# Patient Record
(demographics unavailable — no encounter records)

---

## 2025-06-20 NOTE — HISTORY OF PRESENT ILLNESS
[FreeTextEntry8] : 22-year-old female new patient here to establish care and discuss recent fatigue and anxiety. Reports history of anemia, anxiety. Currently being treated with sertraline 200 mg daily and following with psych.   Patient states over the past year has been noticing extreme sleepiness to the point where family and friends commenting on her fatigue. States she often feels sleepy and can fall asleep at any time she is not being physical or engaging with people.  Typical daily schedule: Often naps between 5 to 8 PM after work and goes to sleep around 10 PM. Typically gets 7-8 hours of sleep during the week and on weekends can sleep up to 15 hours overnight. Patient states despite sleep significant sleep hours she can still nap during the day even when sleeping after 15 hours.   [place] Sleepiness Scale score 10.   Living with mom Graduated from college in criminology  Vaping one disposable pen every 2-3 weeks, desires to reduce intake  No tobacco use Alcohol use once every 1-2 months.  Works in  No regular exercise regimen, previously did gymnastics in school.  Sexually active with boyfriend, using withdrawal for contraception.  LMP 5/27/25  History of chlamydia 2022 with different partner.   Anxiety/panic disorder - well controlled on sertraline 200mg for several years with no recent dose changes. No current therapy.

## 2025-06-20 NOTE — HISTORY OF PRESENT ILLNESS
[FreeTextEntry8] : 22-year-old female new patient here to establish care and discuss recent fatigue and anxiety. Reports history of anemia, anxiety. Currently being treated with sertraline 200 mg daily and following with psych.   Patient states over the past year has been noticing extreme sleepiness to the point where family and friends commenting on her fatigue. States she often feels sleepy and can fall asleep at any time she is not being physical or engaging with people.  Typical daily schedule: Often naps between 5 to 8 PM after work and goes to sleep around 10 PM. Typically gets 7-8 hours of sleep during the week and on weekends can sleep up to 15 hours overnight. Patient states despite sleep significant sleep hours she can still nap during the day even when sleeping after 15 hours.   [place] Sleepiness Scale score 10.   Living with mom Graduated from college in criminology  Vaping one disposable pen every 2-3 weeks, desires to reduce intake  No tobacco use Alcohol use once every 1-2 months.  Works in  No regular exercise regimen, previously did gymnastics in school.  Sexually active with boyfriend, using withdrawal for contraception.  LMP 5/27/25  History of chlamydia 2022 with different partner.   Anxiety/panic disorder - well controlled on sertraline 200mg for several years with no recent dose changes. No current therapy.

## 2025-06-20 NOTE — HEALTH RISK ASSESSMENT
[0] : 2) Feeling down, depressed, or hopeless: Not at all (0) [PHQ-2 Negative - No further assessment needed] : PHQ-2 Negative - No further assessment needed [Never] : Never [[id number]] : 0

## 2025-06-20 NOTE — ASSESSMENT
[FreeTextEntry1] : Fatigue possibly due to anemia, electrolyte abnormality, sleep disorder Labs, urine, STI testing in office POC urine preg negative Will discuss contraception in future visits Sleep study referral  if all testing negative may be adverse effect of high sertraline dose

## 2025-06-20 NOTE — HEALTH RISK ASSESSMENT
[0] : 2) Feeling down, depressed, or hopeless: Not at all (0) [PHQ-2 Negative - No further assessment needed] : PHQ-2 Negative - No further assessment needed [Never] : Never [[id number]] : 0